# Patient Record
(demographics unavailable — no encounter records)

---

## 2025-07-16 NOTE — ADDENDUM
[FreeTextEntry1] : A portion of this note was written by [Filipe Abraham] on 07/09/2025 acting as a scribe for Dr. Brown.   I have personally reviewed the chart and agree that the record accurately reflects my personal performance of the history, physical exam, assessment, and plan.

## 2025-07-16 NOTE — ASSESSMENT
[FreeTextEntry1] : I discussed the findings and options with Ms. RONALD ALEXIS in detail.   The patient and I discussed what the pelvic floor is and what pelvic floor dysfunction is.   I explained that the pelvic floor is a group of muscles that links the "front" of your core (abs, obliques) to the "back" of your core (lats, erectors). It is a group of muscles that most people have poor awareness of. It includes (anteriorly) the superficial and deep transverse perineals, the ischiocavernosus, and bulbospongiosus. Posteriorly it includes the levators, pubococcygeus, iliococcygeus, coccygeus, and levators.  Because the urethra, vagina and rectum traverse these muscles, non-relaxation of the pelvic floor can cause vaginal, urinary and bowel issues. Additionally the bladder sits on top of the pelvic floor and can be affected by PF abnormalities. And because these muscles also have bony attachments, PFD can cause lower back pelvic, suprapubic and hip pain.  While many patients have PFD for unclear reasons, some patients have a h/o horseback riding or sexual abuse/assault. In many patients, the condition of PFD may precede symptoms by quite some time.  Treatment for PFD is multimodal. It almost always requires physical therapy and biofeedback. Intravaginal relaxing agents and trigger point injections as well as stress reduction and bowel regimens can be used as adjuncts. Treatment may take quite some time and requires patience: the patient must first learn the muscles affected and then work on relaxing them. There are multiple specialists that can be involved in this treatment.   - For now, we agreed to proceed with pelvic floor physical therapy and the appropriate referral was provided. We discussed the benefits of this approach, which would involve 6-8 weeks of once or twice per week therapy. - Valium suppositories sent to pharmacy.  - UA/UC   F/u 2-3 months after PFPT.  Patient expressed understanding.   The total amount of time I have personally spent preparing for this visit, reviewing the patient's test results, obtaining external history, ordering tests/medications, documenting clinical information, communicating with and counseling the patient/family and/or caregiver(s), reviewing old records, and spent face to face with the patient explaining the above was 45 minutes.

## 2025-07-16 NOTE — HISTORY OF PRESENT ILLNESS
[FreeTextEntry1] : 07/09/2025 --  35 F referred by Dr. Olivera. Patient presenting with nocturnal urinary frequency and urgency, along with the feeling of incomplete voiding, particularly when lying down at night. Reports nocturia every 30 minutes, resulting in significant sleep disruption and subsequent general fatigue. States that the bladder is waking her up at night. Notes urgency and frequency worse when lying down. Patient reports good daytime bladder control, able to hold urine for 2-3 hours and without leakage during the day.  Admits to straining and pushing during urination. Admits 1 UTI over the past 5 years. States she has done well with cipro.   Gynecological evaluation up to date, no fibroids on TVUS as per pt. No records with her today.  Patient reports no sexual activity in the past year due to pain at the introitus. History of vaginismus, diagnosed by a gynecologist, with improvement noted after pelvic floor physical therapy and dilator use, though effectiveness is uncertain due to current inactivity.  Reports she had rheumatology consult. States lupus was ruled out for chronic fatigue with difficulty sleeping and chronic constipation, currently managed effectively with Linzess.  PMH: anxiety/depression PSH:  breast augmentation  FH: no known  malignancies  SocHx: non smoker, social alcohol  Meds: probiotics  Allergies: bacitracin

## 2025-07-16 NOTE — ASSESSMENT
[FreeTextEntry1] : I discussed the findings and options with Ms. RONALD ALEXIS in detail.   The patient and I discussed what the pelvic floor is and what pelvic floor dysfunction is.   I explained that the pelvic floor is a group of muscles that links the "front" of your core (abs, obliques) to the "back" of your core (lats, erectors). It is a group of muscles that most people have poor awareness of. It includes (anteriorly) the superficial and deep transverse perineals, the ischiocavernosus, and bulbospongiosus. Posteriorly it includes the levators, pubococcygeus, iliococcygeus, coccygeus, and levators.  Because the urethra, vagina and rectum traverse these muscles, non-relaxation of the pelvic floor can cause vaginal, urinary and bowel issues. Additionally the bladder sits on top of the pelvic floor and can be affected by PF abnormalities. And because these muscles also have bony attachments, PFD can cause lower back pelvic, suprapubic and hip pain.  While many patients have PFD for unclear reasons, some patients have a h/o horseback riding or sexual abuse/assault. In many patients, the condition of PFD may precede symptoms by quite some time.  Treatment for PFD is multimodal. It almost always requires physical therapy and biofeedback. Intravaginal relaxing agents and trigger point injections as well as stress reduction and bowel regimens can be used as adjuncts. Treatment may take quite some time and requires patience: the patient must first learn the muscles affected and then work on relaxing them. There are multiple specialists that can be involved in this treatment.   - For now, we agreed to proceed with pelvic floor physical therapy and the appropriate referral was provided. We discussed the benefits of this approach, which would involve 6-8 weeks of once or twice per week therapy. - Valium suppositories sent to pharmacy.  - UA/UC   F/u 2-3 months after PFPT.  Patient expressed understanding.   The total amount of time I have personally spent preparing for this visit, reviewing the patient's test results, obtaining external history, ordering tests/medications, documenting clinical information, communicating with and counseling the patient/family and/or caregiver(s), reviewing old records, and spent face to face with the patient explaining the above was 45 minutes.      [Takes medication as prescribed] : takes [No] : Did not review medication list for presence of high-risk medications.